# Patient Record
Sex: FEMALE | Race: WHITE | NOT HISPANIC OR LATINO | Employment: FULL TIME | URBAN - METROPOLITAN AREA
[De-identification: names, ages, dates, MRNs, and addresses within clinical notes are randomized per-mention and may not be internally consistent; named-entity substitution may affect disease eponyms.]

---

## 2018-01-01 LAB — PAP SMEAR - HIM PATIENT REPORTED: POSITIVE

## 2019-02-04 ENCOUNTER — TRANSFERRED RECORDS (OUTPATIENT)
Dept: HEALTH INFORMATION MANAGEMENT | Facility: CLINIC | Age: 29
End: 2019-02-04

## 2019-02-25 ENCOUNTER — TRANSFERRED RECORDS (OUTPATIENT)
Dept: HEALTH INFORMATION MANAGEMENT | Facility: CLINIC | Age: 29
End: 2019-02-25

## 2019-02-25 LAB
CHOLESTEROL (EXTERNAL): 157 MG/DL (ref 0–199)
GLUCOSE (EXTERNAL): 97 MG/DL (ref 74–106)
HBA1C MFR BLD: 5.2 %
HDLC SERPL-MCNC: 52 MG/DL
LDL CHOLESTEROL CALCULATED (EXTERNAL): 66 MG/DL (ref 0–130)
TRIGLYCERIDES (EXTERNAL): 197 MG/DL (ref 0–149)

## 2020-03-13 ENCOUNTER — TRANSFERRED RECORDS (OUTPATIENT)
Dept: HEALTH INFORMATION MANAGEMENT | Facility: CLINIC | Age: 30
End: 2020-03-13

## 2020-03-13 LAB
HPV ABSTRACT: ABNORMAL
PAP SMEAR - HIM PATIENT REPORTED: POSITIVE
PAP-ABSTRACT: ABNORMAL

## 2020-08-27 ENCOUNTER — TRANSFERRED RECORDS (OUTPATIENT)
Dept: HEALTH INFORMATION MANAGEMENT | Facility: CLINIC | Age: 30
End: 2020-08-27

## 2021-03-29 ENCOUNTER — TRANSFERRED RECORDS (OUTPATIENT)
Dept: HEALTH INFORMATION MANAGEMENT | Facility: CLINIC | Age: 31
End: 2021-03-29

## 2021-09-10 ENCOUNTER — TRANSFERRED RECORDS (OUTPATIENT)
Dept: MULTI SPECIALTY CLINIC | Facility: CLINIC | Age: 31
End: 2021-09-10

## 2021-09-10 LAB — PAP SMEAR - HIM PATIENT REPORTED: NEGATIVE

## 2022-03-17 ENCOUNTER — OFFICE VISIT (OUTPATIENT)
Dept: FAMILY MEDICINE | Facility: CLINIC | Age: 32
End: 2022-03-17
Payer: COMMERCIAL

## 2022-03-17 VITALS
DIASTOLIC BLOOD PRESSURE: 70 MMHG | OXYGEN SATURATION: 99 % | TEMPERATURE: 98.8 F | SYSTOLIC BLOOD PRESSURE: 130 MMHG | HEART RATE: 63 BPM | WEIGHT: 143.4 LBS | HEIGHT: 63 IN | BODY MASS INDEX: 25.41 KG/M2

## 2022-03-17 DIAGNOSIS — Z00.00 ROUTINE GENERAL MEDICAL EXAMINATION AT A HEALTH CARE FACILITY: Primary | ICD-10-CM

## 2022-03-17 DIAGNOSIS — R10.13 DYSPEPSIA: ICD-10-CM

## 2022-03-17 DIAGNOSIS — Z12.4 CERVICAL CANCER SCREENING: ICD-10-CM

## 2022-03-17 PROCEDURE — 99385 PREV VISIT NEW AGE 18-39: CPT | Performed by: PHYSICIAN ASSISTANT

## 2022-03-17 RX ORDER — DROSPIRENONE AND ETHINYL ESTRADIOL 0.02-3(28)
1 KIT ORAL DAILY
COMMUNITY
Start: 2022-02-18 | End: 2022-04-25

## 2022-03-17 ASSESSMENT — ENCOUNTER SYMPTOMS
PARESTHESIAS: 0
HEMATOCHEZIA: 0
MYALGIAS: 0
ARTHRALGIAS: 0
HEARTBURN: 0
COUGH: 0
ABDOMINAL PAIN: 0
HEADACHES: 0
NAUSEA: 0
SHORTNESS OF BREATH: 0
CHILLS: 0
FREQUENCY: 0
CONSTIPATION: 0
JOINT SWELLING: 0
DYSURIA: 0
EYE PAIN: 0
DIARRHEA: 0
SORE THROAT: 0
DIZZINESS: 0
NERVOUS/ANXIOUS: 0
WEAKNESS: 0
PALPITATIONS: 0
FEVER: 0
HEMATURIA: 0

## 2022-03-17 NOTE — PROGRESS NOTES
SUBJECTIVE:   CC: Leda Franks is an 31 year old woman who presents for preventive health visit.       Patient has been advised of split billing requirements and indicates understanding: Yes  Healthy Habits:     Getting at least 3 servings of Calcium per day:  Yes    Bi-annual eye exam:  NO    Dental care twice a year:  Yes    Sleep apnea or symptoms of sleep apnea:  None    Diet:  Other    Frequency of exercise:  4-5 days/week    Duration of exercise:  30-45 minutes    Taking medications regularly:  Yes    Barriers to taking medications:  None    Medication side effects:  Not applicable    PHQ-2 Total Score: 0    Additional concerns today:  Yes          -------------------------------------    Today's PHQ-2 Score:   PHQ-2 ( 1999 Pfizer) 3/17/2022   Q1: Little interest or pleasure in doing things 0   Q2: Feeling down, depressed or hopeless 0   PHQ-2 Score 0   Q1: Little interest or pleasure in doing things Not at all   Q2: Feeling down, depressed or hopeless Not at all   PHQ-2 Score 0       Abuse: Current or Past (Physical, Sexual or Emotional) - No  Do you feel safe in your environment? Yes    Have you ever done Advance Care Planning? (For example, a Health Directive, POLST, or a discussion with a medical provider or your loved ones about your wishes): No, advance care planning information given to patient to review.  Patient declined advance care planning discussion at this time.    Social History     Tobacco Use     Smoking status: Never Smoker     Smokeless tobacco: Never Used   Substance Use Topics     Alcohol use: Yes     Comment: 2 glass of wine per week          Alcohol Use 3/17/2022   Prescreen: >3 drinks/day or >7 drinks/week? No       Reviewed orders with patient.  Reviewed health maintenance and updated orders accordingly - Yes  Lab work is in process    Breast Cancer Screening:    Breast CA Risk Assessment (FHS-7) 3/17/2022   Do you have a family history of breast, colon, or ovarian cancer? No /  "Unknown           Patient notes that she has been having upset stomach despite changing her diet.  These sx are persisting and increasing in severity.  She is suspicious of food allergy and amenable to referral.     Review of Systems   Constitutional: Negative for chills and fever.   HENT: Negative for congestion, ear pain, hearing loss and sore throat.    Eyes: Negative for pain and visual disturbance.   Respiratory: Negative for cough and shortness of breath.    Cardiovascular: Negative for chest pain, palpitations and peripheral edema.   Gastrointestinal: Negative for abdominal pain, constipation, diarrhea, heartburn, hematochezia and nausea.   Genitourinary: Negative for dysuria, frequency, genital sores, hematuria and urgency.   Musculoskeletal: Negative for arthralgias, joint swelling and myalgias.   Skin: Negative for rash.   Neurological: Negative for dizziness, weakness, headaches and paresthesias.   Psychiatric/Behavioral: Negative for mood changes. The patient is not nervous/anxious.           OBJECTIVE:   /70   Pulse 63   Temp 98.8  F (37.1  C) (Oral)   Ht 1.6 m (5' 2.99\")   Wt 65 kg (143 lb 6.4 oz)   LMP 03/02/2022 (Exact Date)   SpO2 99%   BMI 25.41 kg/m    Physical Exam  GENERAL: healthy, alert and no distress  EYES: Eyes grossly normal to inspection, PERRL and conjunctivae and sclerae normal  HENT: ear canals and TM's normal, nose and mouth without ulcers or lesions  NECK: no adenopathy, no asymmetry, masses, or scars and thyroid normal to palpation  RESP: lungs clear to auscultation - no rales, rhonchi or wheezes  CV: regular rate and rhythm, normal S1 S2, no S3 or S4, no murmur, click or rub, no peripheral edema and peripheral pulses strong  ABDOMEN: soft, nontender, no hepatosplenomegaly, no masses and bowel sounds normal   (female): normal female external genitalia, normal urethral meatus, vaginal mucosa pink, moist, well rugated, and normal cervix/adnexa/uterus without masses or " "discharge  MS: no gross musculoskeletal defects noted, no edema  SKIN: no suspicious lesions or rashes  NEURO: Normal strength and tone, mentation intact and speech normal  PSYCH: mentation appears normal, affect normal/bright    Diagnostic Test Results:  none     ASSESSMENT/PLAN:       ICD-10-CM    1. Routine general medical examination at a health care facility  Z00.00    2. Cervical cancer screening  Z12.4 Pap Screen with HPV - recommended age 30 - 65 years   3. Dyspepsia  R10.13 Adult Allergy/Asthma Referral       Patient has been advised of split billing requirements and indicates understanding: Yes    COUNSELING:  Reviewed preventive health counseling, as reflected in patient instructions    Estimated body mass index is 25.41 kg/m  as calculated from the following:    Height as of this encounter: 1.6 m (5' 2.99\").    Weight as of this encounter: 65 kg (143 lb 6.4 oz).        She reports that she has never smoked. She has never used smokeless tobacco.      Counseling Resources:  ATP IV Guidelines  Pooled Cohorts Equation Calculator  Breast Cancer Risk Calculator  BRCA-Related Cancer Risk Assessment: FHS-7 Tool  FRAX Risk Assessment  ICSI Preventive Guidelines  Dietary Guidelines for Americans, 2010  USDA's MyPlate  ASA Prophylaxis  Lung CA Screening    Delio Deras PA-C  [facility]  "

## 2022-03-17 NOTE — Clinical Note
Please abstract the following data from this visit with this patient into the appropriate field in Epic:    Tests that can be patient reported without a hard copy:    Pap smear done on this date: 09/10/2021 (approximately), by this group: [facility] , results were Negative and to follow up in 3 yrs. Hard copy of pap report with colposcopy sent to abstracting.  Elizabeth Lawrence, CMA

## 2022-05-17 ENCOUNTER — OFFICE VISIT (OUTPATIENT)
Dept: ALLERGY | Facility: CLINIC | Age: 32
End: 2022-05-17
Payer: COMMERCIAL

## 2022-05-17 VITALS — HEART RATE: 64 BPM | SYSTOLIC BLOOD PRESSURE: 135 MMHG | OXYGEN SATURATION: 98 % | DIASTOLIC BLOOD PRESSURE: 81 MMHG

## 2022-05-17 DIAGNOSIS — R10.13 DYSPEPSIA: ICD-10-CM

## 2022-05-17 DIAGNOSIS — R14.0 ABDOMINAL BLOATING: ICD-10-CM

## 2022-05-17 DIAGNOSIS — R10.84 ABDOMINAL PAIN, GENERALIZED: Primary | ICD-10-CM

## 2022-05-17 PROCEDURE — 99214 OFFICE O/P EST MOD 30 MIN: CPT | Performed by: ALLERGY & IMMUNOLOGY

## 2022-05-17 NOTE — PROGRESS NOTES
Leda Franks was seen in the Allergy Clinic at [facility].    Leda Franks is a 32 year old White female being seen today at the request of Delio Deras PA-C in consultation for possible food allergies.    She has had abdominal bloating, cramping, abdominal pain (mild), flatulence, and belching for the last 9 months, which have been worsening over the last 2 months. She also has loose stools.  She cannot recall a cause for her symptoms.  She did not have a preceding illness, or prolonged antibiotics prior to symptoms starrting.      With dairy avoidance, she feels improved but her symptoms persist.  She is wondering if gluten could be a factor. She did not start any new supplements or change her diet prior to her symptoms starting.  No obvious increase in symptoms with gluten, but she does have increased symptoms with dairy.      She does not have reflux symptoms.  She does not have blood in her stool nor urgency.    GasX and probiotics were not helpful.         She has a history of allergic rhinitis and had allergy shots with great results in the early 2000's.      PAST MEDICAL HISTORY:  None    Family History   Problem Relation Age of Onset     Hypertension Father      Hyperlipidemia Father      Chronic Obstructive Pulmonary Disease Maternal Grandmother      Lung Cancer Paternal Grandfather      No past surgical history on file.    ENVIRONMENTAL HISTORY:   [place] The home is heated with a forced air. They do have central air conditioning. The patient's bedroom is furnished with feather/wool bedding or pillows and hard mariajose in bedroom.  Pets inside the house include None. There is no history of cockroach or mice infestation. Do you smoke cigarettes or other recreational drugs? No Do you vape or use an e-cigarette? No. There is/are 0 smokers living in the house. There is/are 0 who smoke ecigarettes/vape living in the house. The house does  not have a damp basement.     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  Her boyfriend works as an .    REVIEW OF SYSTEMS:  General: negative for weight gain. negative for weight loss. negative for changes in sleep.   Eyes: negative for itching. negative for redness. negative for tearing/watering. negative for vision changes  Ears: negative for fullness. negative for hearing loss. negative for dizziness.   Nose: negative for snoring.negative for changes in smell. negative for drainage.   Throat: negative for hoarseness. negative for sore throat. negative for trouble swallowing.   Lungs: negative for cough. negative for shortness of breath.negative for wheezing. negative for sputum production.   Cardiovascular: negative for chest pain. negative for swelling of ankles. negative for fast or irregular heartbeat.   Gastrointestinal: negative for nausea. negative for heartburn. negative for acid reflux. Positive for bloating.   Musculoskeletal: negative for joint pain. negative for joint stiffness. negative for joint swelling.   Neurologic: negative for seizures. negative for fainting. negative for weakness.   Psychiatric: negative for changes in mood. negative for anxiety.   Endocrine: negative for cold intolerance. negative for heat intolerance. negative for tremors.   Hematologic: negative for easy bruising. negative for easy bleeding.  Integumentary: negative for rash. negative for scaling. negative for nail changes.       Current Outpatient Medications:      drospirenone-ethinyl estradiol (JONNY) 3-0.02 MG tablet, Take 1 tablet by mouth daily, Disp: 84 tablet, Rfl: 3  Immunization History   Administered Date(s) Administered     COVID-19,PF,Moderna 01/05/2021, 02/02/2021     COVID-19,PF,Moderna Booster 11/24/2021     HPV Quadrivalent 02/15/2007, 04/20/2007, 08/17/2007     HepB-Adult 11/17/1999, 12/17/1999, 05/23/2000, 04/18/2016     Influenza (intradermal)  03/29/2021     Influenza Vaccine IM > 6 months Valent IIV4 (Alfuria,Fluzone) 10/06/2014, 10/18/2019, 09/24/2020, 10/06/2021     Influenza Vaccine, 6+MO IM (QUADRIVALENT W/PRESERVATIVES) 10/13/2015     TDAP Vaccine (Boostrix) 03/29/2021     Td (Adult), Adsorbed 03/29/2021     Tdap (Adacel,Boostrix) 10/22/2009     No Known Allergies      EXAM:   /81 (BP Location: Left arm, Patient Position: Sitting, Cuff Size: Adult Regular)   Pulse 64   SpO2 98%   GENERAL APPEARANCE: alert, smiling and cooperative  SKIN: no rashes, no lesions  HEAD: atraumatic, normocephalic  EYES: lids and lashes normal, conjunctivae and sclerae clear  ENT: nasal exam showed no discharge, swelling or lesions noted, clear rhinorrhea and nasal septal deviation to the right  LUNGS: unlabored respirations, no intercostal retractions or accessory muscle use, clear to auscultation without rales or wheezes  HEART: regular rate and rhythm without murmurs  NEURO: no focal deficits noted, mental status intact  PSYCH: does not appear depressed or anxious and age appropriate mood/affect    ASSESSMENT/PLAN:  Leda Fransk is a 32 year old female with persistent abdominal bloating, flatulence, mild abdominal pain, and belching.  Dairy is an aggravating factor.      Discussed that her symptoms are not consistent with a food allergy, but may be related to a food intolerance. There is no validated skin or laboratory testing to diagnose food intolerances, and food elimination and/or a food diary is the best way to determine if foods are contributing.  Due to persistent symptoms despite lactose avoidance, will recommend GI referral.    1.  Continue lactose avoidance.  Handout provided on lactose intolerance.   2.  Consider Gluten avoidance for 2-4 weeks.  3.  Will refer to Gastroenterology for evaluation.       Follow-up in the allergy clinic as needed.      Thank you for allowing me to participate in the care of Leda Franks.      A total of 40 minutes  was spent on the day of the encounter performing chart review, history and exam, documentation, and counseling and coordination of care as noted above.       Emily Pennington MD, FAAAAI  Allergy/Immunology  [facility]      Chart documentation done in part with Dragon Voice Recognition Software. Although reviewed after completion, some word and grammatical errors may remain.

## 2022-05-17 NOTE — LETTER
5/17/2022         RE: Leda Franks  [address]  [place] [address]        Dear Colleague,    Thank you for referring your patient, Leda Franks, to the [facility]. Please see a copy of my visit note below.    Leda Franks was seen in the Allergy Clinic at [facility].    Leda Franks is a 32 year old White female being seen today at the request of Delio Deras PA-C in consultation for possible food allergies.    She has had abdominal bloating, cramping, abdominal pain (mild), flatulence, and belching for the last 9 months, which have been worsening over the last 2 months. She also has loose stools.  She cannot recall a cause for her symptoms.  She did not have a preceding illness, or prolonged antibiotics prior to symptoms starrting.      With dairy avoidance, she feels improved but her symptoms persist.  She is wondering if gluten could be a factor. She did not start any new supplements or change her diet prior to her symptoms starting.  No obvious increase in symptoms with gluten, but she does have increased symptoms with dairy.      She does not have reflux symptoms.  She does not have blood in her stool nor urgency.    GasX and probiotics were not helpful.         She has a history of allergic rhinitis and had allergy shots with great results in the early 2000's.      PAST MEDICAL HISTORY:  None    Family History   Problem Relation Age of Onset     Hypertension Father      Hyperlipidemia Father      Chronic Obstructive Pulmonary Disease Maternal Grandmother      Lung Cancer Paternal Grandfather      No past surgical history on file.    ENVIRONMENTAL HISTORY:   [place] The home is heated with a forced air. They do have central air conditioning. The patient's bedroom is furnished with feather/wool bedding or pillows and hard mariajose in bedroom.  Pets inside the house include None. There is no  history of cockroach or mice infestation. Do you smoke cigarettes or other recreational drugs? No Do you vape or use an e-cigarette? No. There is/are 0 smokers living in the house. There is/are 0 who smoke ecigarettes/vape living in the house. The house does not have a damp basement.     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  Her boyfriend works as an .    REVIEW OF SYSTEMS:  General: negative for weight gain. negative for weight loss. negative for changes in sleep.   Eyes: negative for itching. negative for redness. negative for tearing/watering. negative for vision changes  Ears: negative for fullness. negative for hearing loss. negative for dizziness.   Nose: negative for snoring.negative for changes in smell. negative for drainage.   Throat: negative for hoarseness. negative for sore throat. negative for trouble swallowing.   Lungs: negative for cough. negative for shortness of breath.negative for wheezing. negative for sputum production.   Cardiovascular: negative for chest pain. negative for swelling of ankles. negative for fast or irregular heartbeat.   Gastrointestinal: negative for nausea. negative for heartburn. negative for acid reflux. Positive for bloating.   Musculoskeletal: negative for joint pain. negative for joint stiffness. negative for joint swelling.   Neurologic: negative for seizures. negative for fainting. negative for weakness.   Psychiatric: negative for changes in mood. negative for anxiety.   Endocrine: negative for cold intolerance. negative for heat intolerance. negative for tremors.   Hematologic: negative for easy bruising. negative for easy bleeding.  Integumentary: negative for rash. negative for scaling. negative for nail changes.       Current Outpatient Medications:      drospirenone-ethinyl estradiol (JONNY) 3-0.02 MG tablet, Take 1 tablet by mouth daily, Disp: 84 tablet, Rfl: 3  Immunization History   Administered  Date(s) Administered     COVID-19,PF,Moderna 01/05/2021, 02/02/2021     COVID-19,PF,Moderna Booster 11/24/2021     HPV Quadrivalent 02/15/2007, 04/20/2007, 08/17/2007     HepB-Adult 11/17/1999, 12/17/1999, 05/23/2000, 04/18/2016     Influenza (intradermal) 03/29/2021     Influenza Vaccine IM > 6 months Valent IIV4 (Alfuria,Fluzone) 10/06/2014, 10/18/2019, 09/24/2020, 10/06/2021     Influenza Vaccine, 6+MO IM (QUADRIVALENT W/PRESERVATIVES) 10/13/2015     TDAP Vaccine (Boostrix) 03/29/2021     Td (Adult), Adsorbed 03/29/2021     Tdap (Adacel,Boostrix) 10/22/2009     No Known Allergies      EXAM:   /81 (BP Location: Left arm, Patient Position: Sitting, Cuff Size: Adult Regular)   Pulse 64   SpO2 98%   GENERAL APPEARANCE: alert, smiling and cooperative  SKIN: no rashes, no lesions  HEAD: atraumatic, normocephalic  EYES: lids and lashes normal, conjunctivae and sclerae clear  ENT: nasal exam showed no discharge, swelling or lesions noted, clear rhinorrhea and nasal septal deviation to the right  LUNGS: unlabored respirations, no intercostal retractions or accessory muscle use, clear to auscultation without rales or wheezes  HEART: regular rate and rhythm without murmurs  NEURO: no focal deficits noted, mental status intact  PSYCH: does not appear depressed or anxious and age appropriate mood/affect    ASSESSMENT/PLAN:  Leda Franks is a 32 year old female with persistent abdominal bloating, flatulence, mild abdominal pain, and belching.  Dairy is an aggravating factor.      Discussed that her symptoms are not consistent with a food allergy, but may be related to a food intolerance. There is no validated skin or laboratory testing to diagnose food intolerances, and food elimination and/or a food diary is the best way to determine if foods are contributing.  Due to persistent symptoms despite lactose avoidance, will recommend GI referral.    1.  Continue lactose avoidance.  Handout provided on lactose  intolerance.   2.  Consider Gluten avoidance for 2-4 weeks.  3.  Will refer to Gastroenterology for evaluation.       Follow-up in the allergy clinic as needed.      Thank you for allowing me to participate in the care of Leda Franks.      A total of 40 minutes was spent on the day of the encounter performing chart review, history and exam, documentation, and counseling and coordination of care as noted above.       Emily Pennington MD, [facility]  Allergy/Immunology  [facility]      Chart documentation done in part with Dragon Voice Recognition Software. Although reviewed after completion, some word and grammatical errors may remain.      Again, thank you for allowing me to participate in the care of your patient.        Sincerely,        Emily Pennington MD

## 2022-05-17 NOTE — PATIENT INSTRUCTIONS
Continue lactose avoidance.   Consider Gluten avoidance for 2 weeks.   Will refer to Gastroenterology for evaluation.

## 2022-05-19 ENCOUNTER — OFFICE VISIT (OUTPATIENT)
Dept: FAMILY MEDICINE | Facility: CLINIC | Age: 32
End: 2022-05-19
Payer: COMMERCIAL

## 2022-05-19 VITALS
OXYGEN SATURATION: 97 % | HEART RATE: 62 BPM | DIASTOLIC BLOOD PRESSURE: 89 MMHG | TEMPERATURE: 98.3 F | BODY MASS INDEX: 25.07 KG/M2 | SYSTOLIC BLOOD PRESSURE: 148 MMHG | WEIGHT: 141.5 LBS

## 2022-05-19 DIAGNOSIS — R19.8 BORBORYGMI: Primary | ICD-10-CM

## 2022-05-19 LAB
ALBUMIN SERPL-MCNC: 3.9 G/DL (ref 3.5–5)
ALP SERPL-CCNC: 42 U/L (ref 45–120)
ALT SERPL W P-5'-P-CCNC: <9 U/L (ref 0–45)
ANION GAP SERPL CALCULATED.3IONS-SCNC: 9 MMOL/L (ref 5–18)
AST SERPL W P-5'-P-CCNC: 14 U/L (ref 0–40)
BILIRUB SERPL-MCNC: 0.8 MG/DL (ref 0–1)
BUN SERPL-MCNC: 14 MG/DL (ref 8–22)
CALCIUM SERPL-MCNC: 9.2 MG/DL (ref 8.5–10.5)
CHLORIDE BLD-SCNC: 104 MMOL/L (ref 98–107)
CO2 SERPL-SCNC: 24 MMOL/L (ref 22–31)
CREAT SERPL-MCNC: 0.87 MG/DL (ref 0.6–1.1)
ERYTHROCYTE [DISTWIDTH] IN BLOOD BY AUTOMATED COUNT: 11.7 % (ref 10–15)
GFR SERPL CREATININE-BSD FRML MDRD: 89 ML/MIN/1.73M2
GLUCOSE BLD-MCNC: 98 MG/DL (ref 70–125)
HCT VFR BLD AUTO: 39.1 % (ref 35–47)
HGB BLD-MCNC: 13.5 G/DL (ref 11.7–15.7)
LIPASE SERPL-CCNC: 21 U/L (ref 0–52)
MCH RBC QN AUTO: 30.7 PG (ref 26.5–33)
MCHC RBC AUTO-ENTMCNC: 34.5 G/DL (ref 31.5–36.5)
MCV RBC AUTO: 89 FL (ref 78–100)
PLATELET # BLD AUTO: 287 10E3/UL (ref 150–450)
POTASSIUM BLD-SCNC: 4.1 MMOL/L (ref 3.5–5)
PROT SERPL-MCNC: 7.4 G/DL (ref 6–8)
RBC # BLD AUTO: 4.4 10E6/UL (ref 3.8–5.2)
SODIUM SERPL-SCNC: 137 MMOL/L (ref 136–145)
TSH SERPL DL<=0.005 MIU/L-ACNC: 1.02 UIU/ML (ref 0.3–5)
WBC # BLD AUTO: 9.5 10E3/UL (ref 4–11)

## 2022-05-19 PROCEDURE — 84443 ASSAY THYROID STIM HORMONE: CPT | Performed by: PHYSICIAN ASSISTANT

## 2022-05-19 PROCEDURE — 36415 COLL VENOUS BLD VENIPUNCTURE: CPT | Performed by: PHYSICIAN ASSISTANT

## 2022-05-19 PROCEDURE — 99214 OFFICE O/P EST MOD 30 MIN: CPT | Performed by: PHYSICIAN ASSISTANT

## 2022-05-19 PROCEDURE — 83690 ASSAY OF LIPASE: CPT | Performed by: PHYSICIAN ASSISTANT

## 2022-05-19 PROCEDURE — 80053 COMPREHEN METABOLIC PANEL: CPT | Performed by: PHYSICIAN ASSISTANT

## 2022-05-19 PROCEDURE — 85027 COMPLETE CBC AUTOMATED: CPT | Performed by: PHYSICIAN ASSISTANT

## 2022-05-19 ASSESSMENT — ENCOUNTER SYMPTOMS
NAUSEA: 0
DIARRHEA: 1
VOMITING: 0
CHILLS: 0
FEVER: 0
ABDOMINAL PAIN: 1

## 2022-05-19 NOTE — PROGRESS NOTES
Patient presents with:  Abdominal Pain: Starting Thursday she has been terrible cramps, BM has been more diarrhea. As soon as she eats her stomach starts to rumble and cramp. She has been having these issues for a while, went to PCP referred to allergist, thinks it may have to do with dairy, but this week pain is bad.      Clinical Decision Making: Suspect IBS with D tendency. CBC is normal today. Replaced referral to [facility] in case she is able to be seen by GI sooner.  Differential also includes food intolerances such as gluten or dairy.  Patient will continue with fiber supplement.  Recommend use of loperamide as needed before meals during diarrheal episodes.  TSH, CMP, lipase, and H. pylori test are in process.      ICD-10-CM    1. Borborygmi  R19.8 CBC with platelets     Comprehensive metabolic panel (BMP + Alb, Alk Phos, ALT, AST, Total. Bili, TP)     Helicobacter pylori Antigen Stool     TSH     Lipase     CBC with platelets     Comprehensive metabolic panel (BMP + Alb, Alk Phos, ALT, AST, Total. Bili, TP)     Helicobacter pylori Antigen Stool     TSH     Lipase     Adult Gastro Ref - Consult Only       Patient Instructions   In patients with IBS-diarrhea (IBS-D), we suggest loperamide 2 mg 45 minutes before a meal on regularly scheduled doses.   During bouts of time that you don't have the diarrhea I recommend doing this, but during times of constipation don't take this medicine.     Continue with fiber supplement.    I've placed a new referral to [facility], so it may be quicker to get in with them.     Your CBC looks good no signs of elevated white blood cell count or anemia. I will call you with the results of your other labs when they are available. Some may come back tomorrow and you may hear from my coworkers instead.       HPI:  Leda Franks is a 32 year old female who presents today complaining of abdominal cramping, diarrhea present for the past 1 year, for the past 1 week.  Patient states that as soon  as she eats she has worsening abdominal pain.  Patient was previously evaluated for this by her primary care provider.  Her PCP referred her to an allergist for evaluation of possible dairy allergy. No previous travel, abx, or hospitalizations. She's tried probiotic and fiber without improvement. Can't see GI until January.    History obtained from the patient.    Problem List:  There are no relevant problems documented for this patient.      No past medical history on file.    Social History     Tobacco Use     Smoking status: Never Smoker     Smokeless tobacco: Never Used   Substance Use Topics     Alcohol use: Yes     Comment: 2 glass of wine per week        Review of Systems   Constitutional: Negative for chills and fever.   Gastrointestinal: Positive for abdominal pain and diarrhea. Negative for nausea and vomiting.   Psychiatric/Behavioral:        (+) stress correlation       Vitals:    05/19/22 1653   BP: (!) 148/89   Pulse: 62   Temp: 98.3  F (36.8  C)   TempSrc: Tympanic   SpO2: 97%   Weight: 64.2 kg (141 lb 8 oz)       Physical Exam  Vitals and nursing note reviewed.   Constitutional:       General: She is not in acute distress.     Appearance: She is not toxic-appearing or diaphoretic.   HENT:      Head: Normocephalic and atraumatic.      Right Ear: External ear normal.      Left Ear: External ear normal.   Eyes:      Conjunctiva/sclera: Conjunctivae normal.   Pulmonary:      Effort: Pulmonary effort is normal. No respiratory distress.   Abdominal:      General: Abdomen is flat. There is no distension.      Palpations: Abdomen is soft.      Tenderness: There is no abdominal tenderness. There is no guarding or rebound.      Hernia: No hernia is present.   Neurological:      Mental Status: She is alert.   Psychiatric:         Mood and Affect: Mood normal.         Behavior: Behavior normal.         Thought Content: Thought content normal.         Judgment: Judgment normal.       Results:  Results for orders  placed or performed in visit on 05/19/22   CBC with platelets     Status: Normal   Result Value Ref Range    WBC Count 9.5 4.0 - 11.0 10e3/uL    RBC Count 4.40 3.80 - 5.20 10e6/uL    Hemoglobin 13.5 11.7 - 15.7 g/dL    Hematocrit 39.1 35.0 - 47.0 %    MCV 89 78 - 100 fL    MCH 30.7 26.5 - 33.0 pg    MCHC 34.5 31.5 - 36.5 g/dL    RDW 11.7 10.0 - 15.0 %    Platelet Count 287 150 - 450 10e3/uL       At the end of the encounter, I discussed results, diagnosis, medications. Discussed red flags for immediate return to clinic/ER, as well as indications for follow up if no improvement. Patient understood and agreed to plan. Patient was stable for discharge.

## 2022-05-19 NOTE — PATIENT INSTRUCTIONS
In patients with IBS-diarrhea (IBS-D), we suggest loperamide 2 mg 45 minutes before a meal on regularly scheduled doses.   During bouts of time that you don't have the diarrhea I recommend doing this, but during times of constipation don't take this medicine.     Continue with fiber supplement.    I've placed a new referral to [facility], so it may be quicker to get in with them.     Your CBC looks good no signs of elevated white blood cell count or anemia. I will call you with the results of your other labs when they are available. Some may come back tomorrow and you may hear from my coworkers instead.

## 2022-05-21 PROCEDURE — 87338 HPYLORI STOOL AG IA: CPT | Performed by: PHYSICIAN ASSISTANT

## 2022-05-23 LAB
Lab: NORMAL
PERFORMING LABORATORY: NORMAL
SPECIMEN STATUS: NORMAL
TEST NAME: NORMAL

## 2022-05-25 LAB — MISCELLANEOUS TEST 1 (ARUP): NORMAL

## 2022-05-29 ENCOUNTER — HEALTH MAINTENANCE LETTER (OUTPATIENT)
Age: 32
End: 2022-05-29

## 2022-06-21 ENCOUNTER — TRANSFERRED RECORDS (OUTPATIENT)
Dept: HEALTH INFORMATION MANAGEMENT | Facility: CLINIC | Age: 32
End: 2022-06-21

## 2022-10-03 ENCOUNTER — HEALTH MAINTENANCE LETTER (OUTPATIENT)
Age: 32
End: 2022-10-03

## 2023-03-31 ENCOUNTER — MYC REFILL (OUTPATIENT)
Dept: FAMILY MEDICINE | Facility: CLINIC | Age: 33
End: 2023-03-31
Payer: COMMERCIAL

## 2023-03-31 DIAGNOSIS — Z30.41 ORAL CONTRACEPTIVE USE: ICD-10-CM

## 2023-04-03 RX ORDER — DROSPIRENONE AND ETHINYL ESTRADIOL 0.02-3(28)
1 KIT ORAL DAILY
Qty: 28 TABLET | Refills: 0 | Status: SHIPPED | OUTPATIENT
Start: 2023-04-03 | End: 2023-04-18

## 2023-04-18 ENCOUNTER — OFFICE VISIT (OUTPATIENT)
Dept: FAMILY MEDICINE | Facility: CLINIC | Age: 33
End: 2023-04-18
Payer: COMMERCIAL

## 2023-04-18 VITALS
DIASTOLIC BLOOD PRESSURE: 89 MMHG | HEART RATE: 59 BPM | BODY MASS INDEX: 24.52 KG/M2 | SYSTOLIC BLOOD PRESSURE: 135 MMHG | TEMPERATURE: 97.8 F | OXYGEN SATURATION: 100 % | WEIGHT: 143.6 LBS | HEIGHT: 64 IN

## 2023-04-18 DIAGNOSIS — F41.9 ANXIETY: ICD-10-CM

## 2023-04-18 DIAGNOSIS — Z30.41 ORAL CONTRACEPTIVE USE: ICD-10-CM

## 2023-04-18 DIAGNOSIS — Z00.00 ROUTINE GENERAL MEDICAL EXAMINATION AT A HEALTH CARE FACILITY: Primary | ICD-10-CM

## 2023-04-18 PROCEDURE — 99395 PREV VISIT EST AGE 18-39: CPT | Performed by: PHYSICIAN ASSISTANT

## 2023-04-18 PROCEDURE — 99213 OFFICE O/P EST LOW 20 MIN: CPT | Mod: 25 | Performed by: PHYSICIAN ASSISTANT

## 2023-04-18 RX ORDER — DROSPIRENONE AND ETHINYL ESTRADIOL 0.02-3(28)
1 KIT ORAL DAILY
Qty: 84 TABLET | Refills: 3 | Status: SHIPPED | OUTPATIENT
Start: 2023-04-18 | End: 2023-12-26

## 2023-04-18 ASSESSMENT — ANXIETY QUESTIONNAIRES
4. TROUBLE RELAXING: SEVERAL DAYS
2. NOT BEING ABLE TO STOP OR CONTROL WORRYING: MORE THAN HALF THE DAYS
IF YOU CHECKED OFF ANY PROBLEMS ON THIS QUESTIONNAIRE, HOW DIFFICULT HAVE THESE PROBLEMS MADE IT FOR YOU TO DO YOUR WORK, TAKE CARE OF THINGS AT HOME, OR GET ALONG WITH OTHER PEOPLE: VERY DIFFICULT
6. BECOMING EASILY ANNOYED OR IRRITABLE: NEARLY EVERY DAY
3. WORRYING TOO MUCH ABOUT DIFFERENT THINGS: MORE THAN HALF THE DAYS
1. FEELING NERVOUS, ANXIOUS, OR ON EDGE: NEARLY EVERY DAY
GAD7 TOTAL SCORE: 11
5. BEING SO RESTLESS THAT IT IS HARD TO SIT STILL: NOT AT ALL
8. IF YOU CHECKED OFF ANY PROBLEMS, HOW DIFFICULT HAVE THESE MADE IT FOR YOU TO DO YOUR WORK, TAKE CARE OF THINGS AT HOME, OR GET ALONG WITH OTHER PEOPLE?: VERY DIFFICULT
GAD7 TOTAL SCORE: 11
7. FEELING AFRAID AS IF SOMETHING AWFUL MIGHT HAPPEN: NOT AT ALL
7. FEELING AFRAID AS IF SOMETHING AWFUL MIGHT HAPPEN: NOT AT ALL

## 2023-04-18 ASSESSMENT — ENCOUNTER SYMPTOMS
CONSTIPATION: 0
COUGH: 0
ABDOMINAL PAIN: 0
HEMATURIA: 0
PALPITATIONS: 0
HEADACHES: 0
HEARTBURN: 0
DIZZINESS: 0
FREQUENCY: 1
SORE THROAT: 0
NERVOUS/ANXIOUS: 1
SHORTNESS OF BREATH: 0
NAUSEA: 0
PARESTHESIAS: 0
EYE PAIN: 0
WEAKNESS: 0
JOINT SWELLING: 0
MYALGIAS: 0
HEMATOCHEZIA: 0
DYSURIA: 0
CHILLS: 0
FEVER: 0
DIARRHEA: 0
ARTHRALGIAS: 0
BREAST MASS: 0

## 2023-04-18 NOTE — PATIENT INSTRUCTIONS
Employee Assistance Program.         Patient Education    Preventive Health Recommendations  Female Ages 26 - 39  Yearly exam:   See your health care provider every year in order to  Review health changes.   Discuss preventive care.    Review your medicines if you your doctor has prescribed any.    Until age 30: Get a Pap test every three years (more often if you have had an abnormal result).    After age 30: Talk to your doctor about whether you should have a Pap test every 3 years or have a Pap test with HPV screening every 5 years.   You do not need a Pap test if your uterus was removed (hysterectomy) and you have not had cancer.  You should be tested each year for STDs (sexually transmitted diseases), if you're at risk.   Talk to your provider about how often to have your cholesterol checked.  If you are at risk for diabetes, you should have a diabetes test (fasting glucose).  Shots: Get a flu shot each year. Get a tetanus shot every 10 years.   Nutrition:   Eat at least 5 servings of fruits and vegetables each day.  Eat whole-grain bread, whole-wheat pasta and brown rice instead of white grains and rice.  Get adequate Calcium and Vitamin D.     Lifestyle  Exercise at least 150 minutes a week (30 minutes a day, 5 days of the week). This will help you control your weight and prevent disease.  Limit alcohol to one drink per day.  No smoking.   Wear sunscreen to prevent skin cancer.  See your dentist every six months for an exam and cleaning.

## 2023-04-18 NOTE — PROGRESS NOTES
SUBJECTIVE:   CC: Leda is an 32 year old who presents for preventive health visit.       4/18/2023     4:40 PM   Additional Questions   Roomed by margaret kline     Patient has been advised of split billing requirements and indicates understanding: Yes  Healthy Habits:     Getting at least 3 servings of Calcium per day:  Yes    Bi-annual eye exam:  NO    Dental care twice a year:  Yes    Sleep apnea or symptoms of sleep apnea:  None    Diet:  Other    Frequency of exercise:  4-5 days/week    Duration of exercise:  30-45 minutes    Taking medications regularly:  Yes    Medication side effects:  None    PHQ-2 Total Score: 0    Additional concerns today:  No      Lots of life changes- all good. Feels on edge all the time. Last 1 year, mood not the same. No suicidal thoughts.   Has not done any counseling.           Today's PHQ-2 Score:       4/18/2023     4:38 PM   PHQ-2 ( 1999 [employer])   Q1: Little interest or pleasure in doing things 0   Q2: Feeling down, depressed or hopeless 0   PHQ-2 Score 0   Q1: Little interest or pleasure in doing things Not at all   Q2: Feeling down, depressed or hopeless Not at all   PHQ-2 Score 0           Social History     Tobacco Use     Smoking status: Never     Smokeless tobacco: Never   Vaping Use     Vaping status: Never Used   Substance Use Topics     Alcohol use: Yes     Comment: 2 glass of wine per week              4/18/2023     4:37 PM   Alcohol Use   Prescreen: >3 drinks/day or >7 drinks/week? No     Reviewed orders with patient.  Reviewed health maintenance and updated orders accordingly - Yes  Lab work is in process    Breast Cancer Screening:        3/17/2022     3:50 PM   Breast CA Risk Assessment (FHS-7)   Do you have a family history of breast, colon, or ovarian cancer? No / Unknown       click delete button to remove this line now  Patient under 40 years of age: Routine Mammogram Screening not recommended.   Pertinent mammograms are reviewed under the imaging tab.    History  "of abnormal Pap smear: NO - age 30-65 PAP every 5 years with negative HPV co-testing recommended     Reviewed and updated as needed this visit by clinical staff   Tobacco  Allergies  Meds  Problems  Med Hx  Surg Hx  Fam Hx          Reviewed and updated as needed this visit by Provider   Tobacco  Allergies  Meds  Problems  Med Hx  Surg Hx  Fam Hx             Review of Systems   Constitutional: Negative for chills and fever.   HENT: Negative for congestion, ear pain, hearing loss and sore throat.    Eyes: Negative for pain and visual disturbance.   Respiratory: Negative for cough and shortness of breath.    Cardiovascular: Negative for chest pain, palpitations and peripheral edema.   Gastrointestinal: Negative for abdominal pain, constipation, diarrhea, heartburn, hematochezia and nausea.   Breasts:  Negative for tenderness, breast mass and discharge.   Genitourinary: Positive for frequency. Negative for dysuria, genital sores, hematuria, pelvic pain, urgency, vaginal bleeding and vaginal discharge.   Musculoskeletal: Negative for arthralgias, joint swelling and myalgias.   Skin: Negative for rash.   Neurological: Negative for dizziness, weakness, headaches and paresthesias.   Psychiatric/Behavioral: Positive for mood changes. The patient is nervous/anxious.           OBJECTIVE:   /89 (BP Location: Left arm, Patient Position: Chair, Cuff Size: Adult Regular)   Pulse 59   Temp 97.8  F (36.6  C) (Oral)   Ht 1.626 m (5' 4\")   Wt 65.1 kg (143 lb 9.6 oz)   LMP 03/27/2023 (Approximate)   SpO2 100%   Breastfeeding No   BMI 24.65 kg/m    Physical Exam  GENERAL: healthy, alert and no distress  EYES: Eyes grossly normal to inspection, PERRL and conjunctivae and sclerae normal  HENT: ear canals and TM's normal, nose and mouth without ulcers or lesions  NECK: no adenopathy, no asymmetry, masses, or scars and thyroid normal to palpation  RESP: lungs clear to auscultation - no rales, rhonchi or " wheezes  CV: regular rate and rhythm, normal S1 S2, no S3 or S4, no murmur,   ABDOMEN: soft, nontender, no hepatosplenomegaly, no masses and bowel sounds normal  MS: no gross musculoskeletal defects noted, no edema  SKIN: no suspicious lesions or rashes  NEURO: Normal strength and tone, mentation intact and speech normal  PSYCH: mentation appears normal, affect normal/bright    Diagnostic Test Results:  none     ASSESSMENT/PLAN:   (Z00.00) Routine general medical examination at a health care facility  (primary encounter diagnosis)  Comment:   Plan:     (Z30.41) Oral contraceptive use  Comment:   Plan: drospirenone-ethinyl estradiol (JONNY) 3-0.02 MG         tablet        Patient plans for pregnancy in the next year, will stop when ready and start prenatal.    (F41.9) Anxiety  Comment:   Plan: Adult Mental Health  Referral        Counseling to start, will also [facility] into the [facility] to see if they have an employee assistance program.             COUNSELING:  Reviewed preventive health counseling, as reflected in patient instructions       Regular exercise       Healthy diet/nutrition       Contraception       Family planning       Safe sex practices/STD prevention        She reports that she has never smoked. She has never used smokeless tobacco.          Aida Serrato PA-C  [facility] [place]  Answers for HPI/ROS submitted by the patient on 4/18/2023  JAY 7 TOTAL SCORE: 11

## 2023-07-27 ENCOUNTER — OFFICE VISIT (OUTPATIENT)
Dept: URGENT CARE | Facility: URGENT CARE | Age: 33
End: 2023-07-27
Payer: COMMERCIAL

## 2023-07-27 VITALS
TEMPERATURE: 98.8 F | BODY MASS INDEX: 24.72 KG/M2 | SYSTOLIC BLOOD PRESSURE: 144 MMHG | HEART RATE: 72 BPM | WEIGHT: 144 LBS | DIASTOLIC BLOOD PRESSURE: 82 MMHG | RESPIRATION RATE: 16 BRPM | OXYGEN SATURATION: 98 %

## 2023-07-27 DIAGNOSIS — J06.9 VIRAL UPPER RESPIRATORY TRACT INFECTION: Primary | ICD-10-CM

## 2023-07-27 LAB
DEPRECATED S PYO AG THROAT QL EIA: NEGATIVE
GROUP A STREP BY PCR: NOT DETECTED
SARS-COV-2 RNA RESP QL NAA+PROBE: NEGATIVE

## 2023-07-27 PROCEDURE — 87635 SARS-COV-2 COVID-19 AMP PRB: CPT | Performed by: PHYSICIAN ASSISTANT

## 2023-07-27 PROCEDURE — 99213 OFFICE O/P EST LOW 20 MIN: CPT | Performed by: PHYSICIAN ASSISTANT

## 2023-07-27 PROCEDURE — 87651 STREP A DNA AMP PROBE: CPT | Performed by: PHYSICIAN ASSISTANT

## 2023-07-27 ASSESSMENT — ENCOUNTER SYMPTOMS
COUGH: 1
SHORTNESS OF BREATH: 0
FEVER: 0
NAUSEA: 0
VOMITING: 0
DIARRHEA: 0
HEADACHES: 0
SORE THROAT: 1
RHINORRHEA: 0

## 2023-07-27 NOTE — PROGRESS NOTES
SUBJECTIVE:   Leda Franks is a 33 year old female presenting with a chief complaint of   Chief Complaint   Patient presents with    URI     Cough, sore throat x 3 days        She is an established patient of [place].    Treatment:  dayquil and niquil and ibuprofen      Review of Systems   Constitutional:  Negative for fever.   HENT:  Positive for sore throat. Negative for congestion, ear pain and rhinorrhea.    Respiratory:  Positive for cough. Negative for shortness of breath.    Gastrointestinal:  Negative for diarrhea, nausea and vomiting.   Skin:  Negative for rash.   Neurological:  Negative for headaches.   All other systems reviewed and are negative.      History reviewed. No pertinent past medical history.  Family History   Problem Relation Age of Onset    Hypertension Father     Hyperlipidemia Father     Chronic Obstructive Pulmonary Disease Maternal Grandmother     Lung Cancer Paternal Grandfather      Current Outpatient Medications   Medication Sig Dispense Refill    drospirenone-ethinyl estradiol (JONNY) 3-0.02 MG tablet Take 1 tablet by mouth daily 84 tablet 3     Social History     Tobacco Use    Smoking status: Never    Smokeless tobacco: Never   Substance Use Topics    Alcohol use: Yes     Comment: 2 glass of wine per week        OBJECTIVE  BP (!) 144/82   Pulse 72   Temp 98.8  F (37.1  C) (Tympanic)   Resp 16   Wt 65.3 kg (144 lb)   SpO2 98%   BMI 24.72 kg/m      Physical Exam  Vitals and nursing note reviewed.   Constitutional:       Appearance: Normal appearance. She is normal weight.   HENT:      Head: Normocephalic and atraumatic.      Right Ear: Tympanic membrane, ear canal and external ear normal.      Left Ear: Tympanic membrane, ear canal and external ear normal.      Nose: Nose normal.      Mouth/Throat:      Mouth: Mucous membranes are moist.      Pharynx: Oropharynx is clear.   Eyes:      Extraocular Movements: Extraocular movements intact.      Conjunctiva/sclera: Conjunctivae  normal.   Cardiovascular:      Rate and Rhythm: Normal rate and regular rhythm.      Pulses: Normal pulses.      Heart sounds: Normal heart sounds.   Pulmonary:      Effort: Pulmonary effort is normal.      Breath sounds: Normal breath sounds.   Musculoskeletal:      Cervical back: Normal range of motion.   Skin:     General: Skin is warm and dry.      Findings: No rash.   Neurological:      General: No focal deficit present.      Mental Status: She is alert.   Psychiatric:         Mood and Affect: Mood normal.         Behavior: Behavior normal.         Labs:  Results for orders placed or performed in visit on 07/27/23 (from the past 24 hour(s))   Streptococcus A Rapid Screen w/Reflex to PCR - Clinic Collect    Specimen: Throat; Swab   Result Value Ref Range    Group A Strep antigen Negative Negative       X-Ray was not done.    ASSESSMENT:      ICD-10-CM    1. Viral upper respiratory tract infection  J06.9 Streptococcus A Rapid Screen w/Reflex to PCR - Clinic Collect     Symptomatic COVID-19 Virus (Coronavirus) by PCR Nose     Group A Streptococcus PCR Throat Swab           Medical Decision Making:    Differential Diagnosis:  URI Adult/Peds:  Strep pharyngitis, Viral pharyngitis, Viral syndrome, and Viral upper respiratory illness    Serious Comorbid Conditions:  Adult:   reviewed    PLAN:    Tylenol/motrin as needed.  Drink plenty of water.  Gargle with salt water.  May use chloraseptic spray as directed for ST.  Strep pcr pending.  Discussed and recommended CDC guidelines for self isolation.  COVID test pending.        Followup:    If not improving or if condition worsens, follow up with your Primary Care Provider, If not improving or if conditions worsens over the next 12-24 hours, go to the Emergency Department    There are no Patient Instructions on file for this visit.

## 2023-12-26 ENCOUNTER — OFFICE VISIT (OUTPATIENT)
Dept: FAMILY MEDICINE | Facility: CLINIC | Age: 33
End: 2023-12-26
Payer: COMMERCIAL

## 2023-12-26 VITALS
TEMPERATURE: 97.2 F | OXYGEN SATURATION: 100 % | HEART RATE: 66 BPM | RESPIRATION RATE: 18 BRPM | DIASTOLIC BLOOD PRESSURE: 76 MMHG | WEIGHT: 149 LBS | HEIGHT: 63 IN | BODY MASS INDEX: 26.4 KG/M2 | SYSTOLIC BLOOD PRESSURE: 131 MMHG

## 2023-12-26 DIAGNOSIS — Z31.69 PRE-CONCEPTION COUNSELING: Primary | ICD-10-CM

## 2023-12-26 PROCEDURE — 99212 OFFICE O/P EST SF 10 MIN: CPT | Performed by: PHYSICIAN ASSISTANT

## 2023-12-26 ASSESSMENT — PAIN SCALES - GENERAL: PAINLEVEL: NO PAIN (0)

## 2023-12-26 NOTE — PROGRESS NOTES
"  Assessment & Plan     Pre-conception counseling  Visit spent discussing issues surrounding conception and expectations for OB care etc.   Patient will consider ovulation kits and time intercourse.   Patient will mychart if more than 6 months without a pregnancy and will initiate early fertility referral due to patient age.       15 minutes spent by me on the date of the encounter doing chart review, history and exam, documentation and further activities per the note       BMI:   Estimated body mass index is 26.11 kg/m  as calculated from the following:    Height as of this encounter: 1.609 m (5' 3.35\").    Weight as of this encounter: 67.6 kg (149 lb).           Aida Serrato PA-C  [facility] EDIE Tompkins is a 33 year old, presenting for the following health issues:  No chief complaint on file.        12/26/2023     4:02 PM   Additional Questions   Roomed by Verna       History of Present Illness       Reason for visit:  Talk about getting pregnant    She eats 2-3 servings of fruits and vegetables daily.She consumes 1 sweetened beverage(s) daily.She exercises with enough effort to increase her heart rate 10 to 19 minutes per day.  She exercises with enough effort to increase her heart rate 4 days per week.   She is taking medications regularly.                 Review of Systems         Objective    /76   Pulse 66   Temp 97.2  F (36.2  C) (Tympanic)   Resp 18   Ht 1.609 m (5' 3.35\")   Wt 67.6 kg (149 lb)   LMP 12/11/2023   SpO2 100%   BMI 26.11 kg/m    Body mass index is 26.11 kg/m .  Physical Exam   GENERAL: healthy, alert and no distress                      "

## 2024-01-01 ENCOUNTER — OFFICE VISIT (OUTPATIENT)
Dept: URGENT CARE | Facility: URGENT CARE | Age: 34
End: 2024-01-01
Payer: COMMERCIAL

## 2024-01-01 VITALS
BODY MASS INDEX: 25.44 KG/M2 | OXYGEN SATURATION: 98 % | DIASTOLIC BLOOD PRESSURE: 80 MMHG | SYSTOLIC BLOOD PRESSURE: 124 MMHG | RESPIRATION RATE: 18 BRPM | HEART RATE: 85 BPM | WEIGHT: 149 LBS | TEMPERATURE: 97.4 F | HEIGHT: 64 IN

## 2024-01-01 DIAGNOSIS — R05.1 ACUTE COUGH: Primary | ICD-10-CM

## 2024-01-01 LAB
FLUAV AG SPEC QL IA: NEGATIVE
FLUBV AG SPEC QL IA: POSITIVE

## 2024-01-01 PROCEDURE — 87804 INFLUENZA ASSAY W/OPTIC: CPT | Performed by: STUDENT IN AN ORGANIZED HEALTH CARE EDUCATION/TRAINING PROGRAM

## 2024-01-01 PROCEDURE — 99214 OFFICE O/P EST MOD 30 MIN: CPT | Performed by: STUDENT IN AN ORGANIZED HEALTH CARE EDUCATION/TRAINING PROGRAM

## 2024-01-01 RX ORDER — BENZONATATE 100 MG/1
100 CAPSULE ORAL 3 TIMES DAILY PRN
Qty: 30 CAPSULE | Refills: 0 | Status: SHIPPED | OUTPATIENT
Start: 2024-01-01

## 2024-01-01 NOTE — PROGRESS NOTES
"chief complaint: Cough     HPI:  Leda Acuna is a 33 year old female who presents today complaining of cough that has been ongoing for about 5 days now.  Patient stated that about 5 days ago she initially had fever, myalgia, cough, however fever and myalgia somewhat resolved but now she is having mainly dry cough and fatigue.  Has tried over-the-counter cough medications has not really worked for her.  Patient works in the [employer] and worried about how much she is coughing.  No chest pain or shortness of breath involved.    History obtained from the patient.    Problem List:  There are no relevant problems documented for this patient.      No past medical history on file.    Social History     Tobacco Use    Smoking status: Never    Smokeless tobacco: Never   Substance Use Topics    Alcohol use: Yes     Comment: 2 glass of wine per week        Review of systems  ROS negative except for pertinent positives listed in HPI      Vitals:    01/01/24 1020   BP: 124/80   Pulse: 85   Resp: 18   Temp: 97.4  F (36.3  C)   TempSrc: Temporal   SpO2: 98%   Weight: 67.6 kg (149 lb)   Height: 1.626 m (5' 4\")       Physical Exam  Constitutional: healthy, alert, and no distress  Head: Normocephalic.   Neck: Neck supple. No adenopathy. T  ENT: ENT exam normal, no neck nodes or sinus tenderness  Cardiovascular:  RRR. No murmurs, clicks gallops or rub  Respiratory:unlabored respiratory effort  Musculoskeletal: extremities normal- no gross deformities noted, gait normal  Psychiatric: mentation appears normal and affect normal/bright      Assessment & Plan     Leda was seen today for urgent care and uri.    Diagnoses and all orders for this visit:    Acute cough  Differential diagnosis of patients symptoms includes but not limited to postnasal drip, viral respiratory infection, streptococcal pharyngitis/tonsillitis, bronchiolitis, asthma exacerbation, e.t.c  Of note most likely cause is viral respiratory of infection given positive " influenza test urgent care.    Discussed safety precautions return to the ER.  -Conservative measures such as warm water or tea with honey  -Using a steamer, okay to use vicks menthol in the steamer  -As needed ibuprofen or Tylenol for fever  -Push fluids and rest  -     benzonatate (TESSALON) 100 MG capsule; Take 1 capsule (100 mg) by mouth 3 times daily as needed for cough  -     Influenza A/B antigen- influenza B +      Clinical Decision Making:    At the end of the encounter, I discussed results, diagnosis, medications. Discussed red flags for immediate return to clinic/ER, as well as indications for follow up if no improvement. Patient understood and agreed to plan. Patient was stable for discharge.

## 2024-01-01 NOTE — LETTER
January 1, 2024      Leda Acuna  [address]  [facility] [place] [address]        To Whom It May Concern:    Leda Acuna  was seen on 1/1/24.  Please excuse her  until 1/3/24 due to illness.        Sincerely,        Nay Mayorga MD

## 2024-05-19 ENCOUNTER — HEALTH MAINTENANCE LETTER (OUTPATIENT)
Age: 34
End: 2024-05-19

## 2025-06-08 ENCOUNTER — HEALTH MAINTENANCE LETTER (OUTPATIENT)
Age: 35
End: 2025-06-08